# Patient Record
Sex: FEMALE | Race: BLACK OR AFRICAN AMERICAN | NOT HISPANIC OR LATINO | Employment: FULL TIME | URBAN - METROPOLITAN AREA
[De-identification: names, ages, dates, MRNs, and addresses within clinical notes are randomized per-mention and may not be internally consistent; named-entity substitution may affect disease eponyms.]

---

## 2021-12-07 LAB — POC BETA-HCG (QUAL): NEGATIVE

## 2022-02-07 ENCOUNTER — HISTORICAL (OUTPATIENT)
Dept: SLEEP MEDICINE | Facility: HOSPITAL | Age: 42
End: 2022-02-07

## 2022-04-25 DIAGNOSIS — G43.009 MIGRAINE WITHOUT AURA AND WITHOUT STATUS MIGRAINOSUS, NOT INTRACTABLE: Primary | ICD-10-CM

## 2022-06-01 PROBLEM — E11.9 DIABETES MELLITUS: Status: ACTIVE | Noted: 2022-06-01

## 2022-06-01 PROBLEM — F90.0 ATTENTION DEFICIT HYPERACTIVITY DISORDER (ADHD), PREDOMINANTLY INATTENTIVE TYPE: Status: ACTIVE | Noted: 2022-06-01

## 2022-06-01 PROBLEM — J30.2 SEASONAL ALLERGIES: Status: ACTIVE | Noted: 2022-06-01

## 2022-06-01 PROBLEM — D64.9 ANEMIA: Status: ACTIVE | Noted: 2022-06-01

## 2022-06-01 PROBLEM — H26.9 CATARACT: Status: ACTIVE | Noted: 2022-06-01

## 2022-06-01 PROBLEM — K57.92 DIVERTICULITIS: Status: ACTIVE | Noted: 2022-06-01

## 2022-06-01 PROBLEM — F90.0 ATTENTION DEFICIT HYPERACTIVITY DISORDER (ADHD), PREDOMINANTLY INATTENTIVE TYPE: Chronic | Status: ACTIVE | Noted: 2022-06-01

## 2022-06-01 PROBLEM — D64.9 ANEMIA: Chronic | Status: ACTIVE | Noted: 2022-06-01

## 2022-06-01 NOTE — PROGRESS NOTES
[facility] Neurology initial Office Visit Note    Initial Visit Date:  June 2, 2022  Current Visit Date:  06/02/2022    Chief Complaint:     Chief Complaint   Patient presents with    Migraine     States about 21 migraines a week.          History of Present Illness:      This is 42 y.o. female with history of ADHD, type 2 diabetes, who is referred headache disorder.    Age of Onset : 7 years old     Headache Description:   1. Frontal, holocephalic, pounding, severe, impeding day to day activity, lasting 2 days, w/ nausea, w/ photophobia and phonophobia   2. Frontal, occipital, dull, mild, duration varies, w/o nausea, w/o photophobia and phonophobia      Frequency: daily headache days per month for 12 - 16 migraine headache days per month since she had contract COVID in 11/2020.      Provocation Factors: stress     Risk Factors  - Family history of headache disorder: Yes mom with headache disorder  - History of focal CNS lesions: No  - History of CNS infections: No  - History head trauma: No  - History of underlying mood disorder: No  - History of sleep disorder: Yes not sleeping well at night  - Recreational drug use: Yes CBD use  - Tobacco use: No  - Alcohol use: No  - Weight fluctuation: No  - Isotretinoin or Tetracycline use:  Not Applicable  - Family planning and contraceptive use: Yes OCP     Medications:     Current Prophylactic  Gabapentin 800 mg twice a day (March 16, 2022 to present)    Current Abortive  Celecoxib 200 mg twice a day as needed - twice a day  Methocarbamol    Prior Prophylactic  non    Prior Abortive   Ibuprofen  Tramadol   Toradol     Devices:     - VNS:  - TNS  - TMS:     Procedures:     - Botox:  - PSG block:   - Occipital nerve block:     Labs:     No results found for this or any previous visit.    Studies:     - MRI Brain:   - MRA Head w/o Jayson:   - MRV Head w/o Jayson:   - NCHCT:  - Lumbar Puncture:    Review of Systems:     Review of Systems   All other systems reviewed and are  negative.      Physical Exams:     Vitals:    06/02/22 1332   BP: 126/84   Pulse: 80   Resp: 16   Temp: 97.9 °F (36.6 °C)       Physical Exam  Vitals and nursing note reviewed.   Constitutional:       Appearance: Normal appearance.   HENT:      Head: Normocephalic and atraumatic.      Nose: Nose normal.      Mouth/Throat:      Mouth: Mucous membranes are moist.      Pharynx: Oropharynx is clear.   Eyes:      Conjunctiva/sclera: Conjunctivae normal.   Cardiovascular:      Rate and Rhythm: Normal rate and regular rhythm.      Pulses: Normal pulses.   Pulmonary:      Effort: Pulmonary effort is normal.      Breath sounds: Normal breath sounds.   Abdominal:      General: Abdomen is flat.   Musculoskeletal:         General: Normal range of motion.      Cervical back: Normal range of motion.   Skin:     General: Skin is warm.   Neurological:      Mental Status: She is alert.         Comprehensive Neurological Exam:  Mental Status: Alert Oriented to Self, Date, and Place. Naming, repetition, reading, and writing wnl. Comprehension wnl. No dysarthria.   CN II - XII: PERCY, No APD, Fundus wnl OU, VA grossly intact to finger counting at 6 ft, VFFC, No ptosis OU, EOMI without nystagmus LT/Temp symmetric in CN V1-3 distribution, Hearing grossly intact, Face Symmetric, Tongue and Uvula midline, Trapezius symmetric bilateral.   Motor: tone and bulk wnl throughout, no abnormal involuntary or voluntary movements, 5/5 to confrontation, Fine finger movements wnl b/l, No pronator drift.   Sensory: LT, Proprioception, Vibration, PP, Temp symmetric. No sensory simultagnosia.   Reflexes: 2+ throughout, plantar reflexes downward bilateral.   Cerebellar: FNF wnl b/l, RAHM wnl b/l  Romberg: Negative  Gait: normal.     Assessment:     This is 42 y.o. female with history of  ADHD, type 2 diabetes, who is referred chronic migraine without aura, not intractable and COVID long hauler.      Problem List Items Addressed This Visit        Neuro     Chronic migraine without aura without status migrainosus, not intractable    COVID-19 long hauler manifesting chronic concentration deficit       Psychiatric    Attention deficit hyperactivity disorder (ADHD), predominantly inattentive type (Chronic)       Endocrine    Type II diabetes mellitus, uncontrolled (Chronic)    Relevant Medications    metFORMIN (GLUCOPHAGE-XR) 500 MG ER 24hr tablet       GI    Diverticulitis - Primary          Plan:     [] start TPM ER 50 mg daily   [] start Maxalt 10 mg BID PRN   [] start Reglan 5 mg BID PRN     RTC 3 months    Headache education provided: good sleep hygiene and 7 hours of sleep per night, stress management, medication overuse education provided. Using more 3 OTC per week may worsen headaches, high intensity interval training has shown to reduce headache frequency. Low carb, high protein has shown to reduce headache frequency. Patient is instructed in keep headache diary.     I have explained the treatment plan, diagnosis, and prognosis to patient. All questions are answered to the best of my knowledge.     Face to face time 45 minutes, including documentation, chart review, counseling, education, review of test results, relevant medical records, and coordination of care.       Mariana Avilez MD   General Neurology  06/02/2022

## 2022-06-02 ENCOUNTER — OFFICE VISIT (OUTPATIENT)
Dept: NEUROLOGY | Facility: CLINIC | Age: 42
End: 2022-06-02
Payer: MEDICAID

## 2022-06-02 VITALS
TEMPERATURE: 98 F | BODY MASS INDEX: 39.25 KG/M2 | OXYGEN SATURATION: 99 % | HEART RATE: 80 BPM | WEIGHT: 289.81 LBS | SYSTOLIC BLOOD PRESSURE: 126 MMHG | DIASTOLIC BLOOD PRESSURE: 84 MMHG | RESPIRATION RATE: 16 BRPM | HEIGHT: 72 IN

## 2022-06-02 DIAGNOSIS — G43.709 CHRONIC MIGRAINE WITHOUT AURA WITHOUT STATUS MIGRAINOSUS, NOT INTRACTABLE: Primary | ICD-10-CM

## 2022-06-02 DIAGNOSIS — E11.65 UNCONTROLLED TYPE 2 DIABETES MELLITUS WITH HYPERGLYCEMIA: ICD-10-CM

## 2022-06-02 DIAGNOSIS — K57.92 DIVERTICULITIS: ICD-10-CM

## 2022-06-02 DIAGNOSIS — U09.9 COVID-19 LONG HAULER MANIFESTING CHRONIC CONCENTRATION DEFICIT: ICD-10-CM

## 2022-06-02 DIAGNOSIS — R41.840 COVID-19 LONG HAULER MANIFESTING CHRONIC CONCENTRATION DEFICIT: ICD-10-CM

## 2022-06-02 DIAGNOSIS — F90.0 ATTENTION DEFICIT HYPERACTIVITY DISORDER (ADHD), PREDOMINANTLY INATTENTIVE TYPE: ICD-10-CM

## 2022-06-02 PROCEDURE — 3008F PR BODY MASS INDEX (BMI) DOCUMENTED: ICD-10-PCS | Mod: CPTII,,, | Performed by: PSYCHIATRY & NEUROLOGY

## 2022-06-02 PROCEDURE — 99204 OFFICE O/P NEW MOD 45 MIN: CPT | Mod: S$PBB,,, | Performed by: PSYCHIATRY & NEUROLOGY

## 2022-06-02 PROCEDURE — 1159F MED LIST DOCD IN RCRD: CPT | Mod: CPTII,,, | Performed by: PSYCHIATRY & NEUROLOGY

## 2022-06-02 PROCEDURE — 3074F PR MOST RECENT SYSTOLIC BLOOD PRESSURE < 130 MM HG: ICD-10-PCS | Mod: CPTII,,, | Performed by: PSYCHIATRY & NEUROLOGY

## 2022-06-02 PROCEDURE — 1159F PR MEDICATION LIST DOCUMENTED IN MEDICAL RECORD: ICD-10-PCS | Mod: CPTII,,, | Performed by: PSYCHIATRY & NEUROLOGY

## 2022-06-02 PROCEDURE — 3074F SYST BP LT 130 MM HG: CPT | Mod: CPTII,,, | Performed by: PSYCHIATRY & NEUROLOGY

## 2022-06-02 PROCEDURE — 3008F BODY MASS INDEX DOCD: CPT | Mod: CPTII,,, | Performed by: PSYCHIATRY & NEUROLOGY

## 2022-06-02 PROCEDURE — 3079F DIAST BP 80-89 MM HG: CPT | Mod: CPTII,,, | Performed by: PSYCHIATRY & NEUROLOGY

## 2022-06-02 PROCEDURE — 3079F PR MOST RECENT DIASTOLIC BLOOD PRESSURE 80-89 MM HG: ICD-10-PCS | Mod: CPTII,,, | Performed by: PSYCHIATRY & NEUROLOGY

## 2022-06-02 PROCEDURE — 99215 OFFICE O/P EST HI 40 MIN: CPT | Mod: PBBFAC | Performed by: PSYCHIATRY & NEUROLOGY

## 2022-06-02 PROCEDURE — 99204 PR OFFICE/OUTPT VISIT, NEW, LEVL IV, 45-59 MIN: ICD-10-PCS | Mod: S$PBB,,, | Performed by: PSYCHIATRY & NEUROLOGY

## 2022-06-02 RX ORDER — PANTOPRAZOLE SODIUM 40 MG/1
40 TABLET, DELAYED RELEASE ORAL DAILY
COMMUNITY
Start: 2022-02-22

## 2022-06-02 RX ORDER — ONDANSETRON 4 MG/1
TABLET, ORALLY DISINTEGRATING ORAL
COMMUNITY
Start: 2022-02-15 | End: 2022-06-02

## 2022-06-02 RX ORDER — TAMSULOSIN HYDROCHLORIDE 0.4 MG/1
CAPSULE ORAL
COMMUNITY
Start: 2022-02-24 | End: 2022-06-02

## 2022-06-02 RX ORDER — CETIRIZINE HYDROCHLORIDE 10 MG/1
10 TABLET ORAL DAILY
COMMUNITY
Start: 2022-05-24

## 2022-06-02 RX ORDER — NORETHINDRONE ACETATE AND ETHINYL ESTRADIOL, ETHINYL ESTRADIOL AND FERROUS FUMARATE 1MG-10(24)
1 KIT ORAL DAILY
COMMUNITY
Start: 2022-05-16 | End: 2023-03-29 | Stop reason: SDUPTHER

## 2022-06-02 RX ORDER — CELECOXIB 200 MG/1
200 CAPSULE ORAL 2 TIMES DAILY
COMMUNITY
Start: 2022-04-18 | End: 2023-11-08

## 2022-06-02 RX ORDER — PREDNISONE 10 MG/1
10 TABLET ORAL DAILY
COMMUNITY
Start: 2022-05-02 | End: 2023-07-10 | Stop reason: ALTCHOICE

## 2022-06-02 RX ORDER — METHOCARBAMOL 500 MG/1
500 TABLET, FILM COATED ORAL 3 TIMES DAILY PRN
COMMUNITY
Start: 2022-05-09

## 2022-06-02 RX ORDER — LISDEXAMFETAMINE DIMESYLATE 60 MG/1
CAPSULE ORAL
COMMUNITY
End: 2022-08-25

## 2022-06-02 RX ORDER — GABAPENTIN 800 MG/1
800 TABLET ORAL 2 TIMES DAILY
COMMUNITY
Start: 2022-05-24 | End: 2023-07-10

## 2022-06-02 RX ORDER — RIZATRIPTAN BENZOATE 10 MG/1
10 TABLET ORAL
Qty: 9 TABLET | Refills: 4 | Status: SHIPPED | OUTPATIENT
Start: 2022-06-02 | End: 2022-09-01 | Stop reason: SDUPTHER

## 2022-06-02 RX ORDER — EPINEPHRINE 0.3 MG/.3ML
INJECTION SUBCUTANEOUS
COMMUNITY
Start: 2022-02-15

## 2022-06-02 RX ORDER — TOPIRAMATE 50 MG/1
50 TABLET, FILM COATED ORAL NIGHTLY
Qty: 30 TABLET | Refills: 4 | Status: SHIPPED | OUTPATIENT
Start: 2022-06-02 | End: 2022-09-01 | Stop reason: SDUPTHER

## 2022-06-02 RX ORDER — TRIAMCINOLONE ACETONIDE 1 MG/G
OINTMENT TOPICAL
COMMUNITY
Start: 2022-05-31

## 2022-06-02 RX ORDER — TRAZODONE HYDROCHLORIDE 150 MG/1
150 TABLET ORAL NIGHTLY
COMMUNITY
Start: 2022-05-24

## 2022-06-02 RX ORDER — BUPROPION HYDROCHLORIDE 150 MG/1
150 TABLET, EXTENDED RELEASE ORAL 2 TIMES DAILY
COMMUNITY
Start: 2022-05-11 | End: 2022-08-25

## 2022-06-02 RX ORDER — METFORMIN HYDROCHLORIDE 500 MG/1
500 TABLET, EXTENDED RELEASE ORAL DAILY
COMMUNITY
Start: 2022-05-31 | End: 2023-11-08

## 2022-06-02 RX ORDER — METOCLOPRAMIDE 5 MG/1
5 TABLET ORAL 2 TIMES DAILY PRN
Qty: 20 TABLET | Refills: 1 | Status: SHIPPED | OUTPATIENT
Start: 2022-06-02 | End: 2022-07-29

## 2022-06-02 RX ORDER — HYDROCORTISONE 25 MG/G
CREAM TOPICAL
COMMUNITY
Start: 2022-06-01

## 2022-06-02 RX ORDER — ESZOPICLONE 3 MG/1
TABLET, FILM COATED ORAL
COMMUNITY
End: 2022-08-25

## 2022-06-02 RX ORDER — FLUDROCORTISONE ACETATE 0.1 MG/1
100 TABLET ORAL DAILY
COMMUNITY
Start: 2022-05-24 | End: 2022-08-25

## 2022-06-02 RX ORDER — FERROUS SULFATE 325(65) MG
TABLET ORAL
COMMUNITY
End: 2022-08-25

## 2022-06-02 RX ORDER — PIMECROLIMUS 10 MG/G
1 CREAM TOPICAL 2 TIMES DAILY
COMMUNITY
Start: 2022-05-02

## 2022-06-02 RX ORDER — KETOCONAZOLE 20 MG/G
CREAM TOPICAL
COMMUNITY
Start: 2022-06-01

## 2022-07-01 ENCOUNTER — TELEPHONE (OUTPATIENT)
Dept: NEUROLOGY | Facility: CLINIC | Age: 42
End: 2022-07-01
Payer: MEDICAID

## 2022-07-01 NOTE — TELEPHONE ENCOUNTER
----- Message from Jeane Foote sent at 7/1/2022 10:30 AM CDT -----  Patient requesting sooner follow up with Dr Avilez.   Patient maciel scheduled on 9/01/22    Patient was seen in ED at [facility] for panic attack on 6/30/22.    Please Advise  [phone]

## 2022-07-06 NOTE — TELEPHONE ENCOUNTER
Spoke to patient. She will keep her appointment on 9/1 and will follow up with PCP for anxiety attacks. She will also keep other scheduled appointments such as cardiology and mental health. Instructed to call for further needs--verbalized understanding.

## 2022-07-06 NOTE — TELEPHONE ENCOUNTER
"Spoke to patient; States that she went to ED in [place] 6/30 with her first anxiety attack. Reports tears, heavy legs, shortness of breath, increased BP, and headache while in ED.   According to ED notes, she is to wean Wellbutrin and begin Paxil.   States that she is in the process of weaning Wellbutrin and will begin her Paxil regimen (25 mg po once a day) next week.   States that she has an appointment with her "mental health nurse practitioner" (Kyler Remy at the [facility]) tomorrow. Advised to keep appointment.   Reports that she has an appointment to see her cardiologist this coming Friday (7/8). Advised to keep that appointment as well.   Instructed to keep upcoming appointment with Dr. Avilez on 9/1--verbalizes understanding.    Will request further advisement.  "

## 2022-07-29 DIAGNOSIS — E11.65 UNCONTROLLED TYPE 2 DIABETES MELLITUS WITH HYPERGLYCEMIA: ICD-10-CM

## 2022-07-29 DIAGNOSIS — U09.9 COVID-19 LONG HAULER MANIFESTING CHRONIC CONCENTRATION DEFICIT: ICD-10-CM

## 2022-07-29 DIAGNOSIS — G43.709 CHRONIC MIGRAINE WITHOUT AURA WITHOUT STATUS MIGRAINOSUS, NOT INTRACTABLE: ICD-10-CM

## 2022-07-29 DIAGNOSIS — K57.92 DIVERTICULITIS: ICD-10-CM

## 2022-07-29 DIAGNOSIS — R41.840 COVID-19 LONG HAULER MANIFESTING CHRONIC CONCENTRATION DEFICIT: ICD-10-CM

## 2022-07-29 DIAGNOSIS — F90.0 ATTENTION DEFICIT HYPERACTIVITY DISORDER (ADHD), PREDOMINANTLY INATTENTIVE TYPE: ICD-10-CM

## 2022-07-29 RX ORDER — METOCLOPRAMIDE 5 MG/1
TABLET ORAL
Qty: 20 TABLET | Refills: 1 | Status: SHIPPED | OUTPATIENT
Start: 2022-07-29 | End: 2022-09-01 | Stop reason: SDUPTHER

## 2022-08-25 ENCOUNTER — PROCEDURE VISIT (OUTPATIENT)
Dept: UROLOGY | Facility: CLINIC | Age: 42
End: 2022-08-25
Payer: MEDICAID

## 2022-08-25 VITALS
WEIGHT: 275.38 LBS | HEIGHT: 72 IN | DIASTOLIC BLOOD PRESSURE: 84 MMHG | OXYGEN SATURATION: 98 % | BODY MASS INDEX: 37.3 KG/M2 | HEART RATE: 73 BPM | RESPIRATION RATE: 18 BRPM | SYSTOLIC BLOOD PRESSURE: 120 MMHG

## 2022-08-25 DIAGNOSIS — N39.0 URINARY TRACT INFECTION WITHOUT HEMATURIA, SITE UNSPECIFIED: Primary | ICD-10-CM

## 2022-08-25 LAB
APPEARANCE UR: CLEAR
BACTERIA #/AREA URNS AUTO: ABNORMAL /HPF
BILIRUB UR QL STRIP.AUTO: NEGATIVE MG/DL
CAOX CRY URNS QL MICRO: ABNORMAL /HPF
COLOR UR AUTO: YELLOW
GLUCOSE UR QL STRIP.AUTO: NORMAL MG/DL
HYALINE CASTS #/AREA URNS LPF: ABNORMAL /LPF
KETONES UR QL STRIP.AUTO: NEGATIVE MG/DL
LEUKOCYTE ESTERASE UR QL STRIP.AUTO: NEGATIVE UNIT/L
MUCOUS THREADS URNS QL MICRO: ABNORMAL /LPF
NITRITE UR QL STRIP.AUTO: NEGATIVE
PH UR STRIP.AUTO: 7 [PH]
PROT UR QL STRIP.AUTO: NEGATIVE MG/DL
RBC #/AREA URNS AUTO: ABNORMAL /HPF
RBC UR QL AUTO: ABNORMAL UNIT/L
SP GR UR STRIP.AUTO: 1.01
SQUAMOUS #/AREA URNS LPF: ABNORMAL /HPF
UROBILINOGEN UR STRIP-ACNC: ABNORMAL MG/DL
WBC #/AREA URNS AUTO: ABNORMAL /HPF

## 2022-08-25 PROCEDURE — 52000 CYSTOURETHROSCOPY: CPT | Mod: PBBFAC | Performed by: UROLOGY

## 2022-08-25 PROCEDURE — 87088 URINE BACTERIA CULTURE: CPT | Performed by: UROLOGY

## 2022-08-25 PROCEDURE — 81001 URINALYSIS AUTO W/SCOPE: CPT | Performed by: UROLOGY

## 2022-08-25 RX ORDER — CIPROFLOXACIN 500 MG/1
500 TABLET ORAL
Status: COMPLETED | OUTPATIENT
Start: 2022-08-25 | End: 2022-08-25

## 2022-08-25 RX ADMIN — CIPROFLOXACIN 500 MG: 500 TABLET ORAL at 10:08

## 2022-08-25 NOTE — PROGRESS NOTES
Chief Complaint:   Chief Complaint   Patient presents with    Cystoscopy for recurrent UTIs     Cystoscopy for recurrent UTIs; Last appt: 2022       HPI: Ms. Martinez presents for follow up. Referred to urology clinic for recurrent UTIs. Last positive culture in chart 10/2021. Asymptomatic today. She's had changes in medications since last visit. She continues to follow with her PCP. Here today for cysto.      Allergies:  Review of patient's allergies indicates:  No Known Allergies    Medications:  Current Outpatient Medications   Medication Sig Dispense Refill    celecoxib (CELEBREX) 200 MG capsule       cetirizine (ZYRTEC) 10 MG tablet Take 10 mg by mouth once daily.      ELIDEL 1 % cream Apply 1 application topically 2 (two) times daily.      EPINEPHrine (EPIPEN) 0.3 mg/0.3 mL AtIn       gabapentin (NEURONTIN) 800 MG tablet Take by mouth.      hydrocortisone 2.5 % cream Apply topically.      ketoconazole (NIZORAL) 2 % cream SMARTSI Application Topical 1 to 2 Times Daily      LO LOESTRIN FE 1 mg-10 mcg (24)/10 mcg (2) Tab Take 1 tablet by mouth once daily.      metFORMIN (GLUCOPHAGE-XR) 500 MG ER 24hr tablet Take 500 mg by mouth once daily.      methocarbamoL (ROBAXIN) 500 MG Tab Take 500 mg by mouth 3 (three) times daily.      metoclopramide HCl (REGLAN) 5 MG tablet TAKE 1 TABLET BY MOUTH TWICE DAILY IF NEEDED FOR NAUSEA 20 tablet 1    pantoprazole (PROTONIX) 40 MG tablet       predniSONE (DELTASONE) 10 MG tablet Take 10 mg by mouth once daily.      topiramate (TOPAMAX) 50 MG tablet Take 1 tablet (50 mg total) by mouth nightly. 30 tablet 4    traZODone (DESYREL) 150 MG tablet Take 150 mg by mouth nightly.      triamcinolone acetonide 0.1% (KENALOG) 0.1 % ointment SMARTSI Application Topical 2-3 Times Daily      buPROPion (WELLBUTRIN SR) 150 MG TBSR 12 hr tablet Take 150 mg by mouth 2 (two) times daily.      eszopiclone (LUNESTA) 3 mg Tab 1 tablet immediately before bedtime       ferrous sulfate (FEOSOL) 325 mg (65 mg iron) Tab tablet 1 tablet      fludrocortisone (FLORINEF) 0.1 mg Tab Take 100 mcg by mouth once daily.      lisdexamfetamine (VYVANSE) 60 MG capsule 1 capsule in the morning      rizatriptan (MAXALT) 10 MG tablet Take 1 tablet (10 mg total) by mouth as needed for Migraine (once every 2 hour as needed with max of 2 tablet in 24 hours). 9 tablet 4     No current facility-administered medications for this visit.       Review of Systems:  General: No fever, chills, fatigability, or weight loss.  Skin: No rashes, itching, or changes in color or texture of skin.  Chest: Denies PICKERING, cyanosis, wheezing, cough, and sputum production.  Abdomen: Appetite fine. No weight loss. Denies diarrhea, abdominal pain, hematemesis, or blood in stool.  Musculoskeletal: No joint stiffness or swelling. Denies back pain.  : As above.  All other review of systems negative.    PMH:  History reviewed. No pertinent past medical history.    PSH:  Past Surgical History:   Procedure Laterality Date    Arm surgery      Right arm fracture repair    Cataract surgery      EYE SURGERY      GASTRIC BYPASS      INGUINAL HERNIA REPAIR         FamHx:  History reviewed. No pertinent family history.    SocHx:  Social History     Socioeconomic History    Marital status: Single   Tobacco Use    Smoking status: Former Smoker    Smokeless tobacco: Never Used   Substance and Sexual Activity    Alcohol use: Yes     Comment: 1-2 per month    Drug use: Yes     Comment: CBD Gummies-takes occasionally       Physical Exam:  Vitals:    08/25/22 0949   BP: 120/84   Pulse: 73   Resp: 18     General: A&Ox3, no apparent distress, no deformities  Neck: No masses, normal thyroid  Lungs: CTA michelle, no use of accessory muscles  Heart: RRR, no arrhythmias  Abdomen: Soft, NT, ND, no masses, no hernias, no hepatosplenomegaly  Lymphatic: Neck and groin nodes negative  Skin: The skin is warm and dry. No jaundice.  Ext: No  c/c/e.  GUFemale: External genitalia normal. No lesions. Meatus normal size and location. Urethra normal. No masses. Bladder normal. No fullness or masses. Vagina normal without discharge or lesions.       Procedure:    Informed consent was obtained. She was premedicated with an oral antibiotic. The vagina was prepped with topical betadine. A 17Fr flexible cystoscope was advanced per urethra into the bladder under direct vision. Bilateral Uo's were in orthotopic location. All bladder surfaces were evaluated. There was no evidence of papillary tumor, trabeculation, diverticula or stone. No urethral pathology. Urine cloudy. The scope was removed. She tolerated the procedure well.  Cath urine specimen obtained.    Impression:  Problem List Items Addressed This Visit    None     Visit Diagnoses     Urinary tract infection without hematuria, site unspecified    -  Primary    Relevant Orders    Urine culture    Urinalysis            Plan:  - Cysto negative but urine cloudy. Cath specimen obtained and sent for culture. Treat if positive. UTI prevention with 4 point plan. Only treat symptomatic bacteriuria based on culture. Better to obtain cath specimen to avoid contamination      Le Frankel MD  8/25/2022  Urology

## 2022-08-25 NOTE — PATIENT INSTRUCTIONS
Pt seen by Dr. Frankel. Cysto performed at visit. Consents signed and obtained by staff. Urine was also collected by staff to be brought to lab. Pt will be notified of results. RTC 4 months with Jonnie FARLEY. Pt education given both written and verbal. TM

## 2022-08-27 LAB — BACTERIA UR CULT: NO GROWTH

## 2022-08-31 PROBLEM — G43.709 CHRONIC MIGRAINE WITHOUT AURA WITHOUT STATUS MIGRAINOSUS, NOT INTRACTABLE: Chronic | Status: ACTIVE | Noted: 2022-06-02

## 2022-08-31 PROBLEM — R41.840 COVID-19 LONG HAULER MANIFESTING CHRONIC CONCENTRATION DEFICIT: Chronic | Status: ACTIVE | Noted: 2022-06-02

## 2022-08-31 PROBLEM — U09.9 COVID-19 LONG HAULER MANIFESTING CHRONIC CONCENTRATION DEFICIT: Chronic | Status: ACTIVE | Noted: 2022-06-02
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
## 2022-09-01 ENCOUNTER — OFFICE VISIT (OUTPATIENT)
Dept: NEUROLOGY | Facility: CLINIC | Age: 42
End: 2022-09-01
Payer: MEDICAID

## 2022-09-01 VITALS
BODY MASS INDEX: 38.16 KG/M2 | WEIGHT: 281.75 LBS | DIASTOLIC BLOOD PRESSURE: 82 MMHG | OXYGEN SATURATION: 99 % | RESPIRATION RATE: 18 BRPM | SYSTOLIC BLOOD PRESSURE: 122 MMHG | HEART RATE: 64 BPM | HEIGHT: 72 IN | TEMPERATURE: 98 F

## 2022-09-01 DIAGNOSIS — G43.709 CHRONIC MIGRAINE WITHOUT AURA WITHOUT STATUS MIGRAINOSUS, NOT INTRACTABLE: Primary | Chronic | ICD-10-CM

## 2022-09-01 DIAGNOSIS — R41.840 COVID-19 LONG HAULER MANIFESTING CHRONIC CONCENTRATION DEFICIT: Chronic | ICD-10-CM

## 2022-09-01 DIAGNOSIS — F90.0 ATTENTION DEFICIT HYPERACTIVITY DISORDER (ADHD), PREDOMINANTLY INATTENTIVE TYPE: ICD-10-CM

## 2022-09-01 DIAGNOSIS — E11.65 UNCONTROLLED TYPE 2 DIABETES MELLITUS WITH HYPERGLYCEMIA: ICD-10-CM

## 2022-09-01 DIAGNOSIS — K57.92 DIVERTICULITIS: ICD-10-CM

## 2022-09-01 DIAGNOSIS — U09.9 COVID-19 LONG HAULER MANIFESTING CHRONIC CONCENTRATION DEFICIT: Chronic | ICD-10-CM

## 2022-09-01 PROCEDURE — 3079F PR MOST RECENT DIASTOLIC BLOOD PRESSURE 80-89 MM HG: ICD-10-PCS | Mod: CPTII,,, | Performed by: PSYCHIATRY & NEUROLOGY

## 2022-09-01 PROCEDURE — 3079F DIAST BP 80-89 MM HG: CPT | Mod: CPTII,,, | Performed by: PSYCHIATRY & NEUROLOGY

## 2022-09-01 PROCEDURE — 1159F MED LIST DOCD IN RCRD: CPT | Mod: CPTII,,, | Performed by: PSYCHIATRY & NEUROLOGY

## 2022-09-01 PROCEDURE — 99215 OFFICE O/P EST HI 40 MIN: CPT | Mod: S$PBB,,, | Performed by: PSYCHIATRY & NEUROLOGY

## 2022-09-01 PROCEDURE — 3008F PR BODY MASS INDEX (BMI) DOCUMENTED: ICD-10-PCS | Mod: CPTII,,, | Performed by: PSYCHIATRY & NEUROLOGY

## 2022-09-01 PROCEDURE — 99215 PR OFFICE/OUTPT VISIT, EST, LEVL V, 40-54 MIN: ICD-10-PCS | Mod: S$PBB,,, | Performed by: PSYCHIATRY & NEUROLOGY

## 2022-09-01 PROCEDURE — 3008F BODY MASS INDEX DOCD: CPT | Mod: CPTII,,, | Performed by: PSYCHIATRY & NEUROLOGY

## 2022-09-01 PROCEDURE — 1159F PR MEDICATION LIST DOCUMENTED IN MEDICAL RECORD: ICD-10-PCS | Mod: CPTII,,, | Performed by: PSYCHIATRY & NEUROLOGY

## 2022-09-01 PROCEDURE — 3074F PR MOST RECENT SYSTOLIC BLOOD PRESSURE < 130 MM HG: ICD-10-PCS | Mod: CPTII,,, | Performed by: PSYCHIATRY & NEUROLOGY

## 2022-09-01 PROCEDURE — 99215 OFFICE O/P EST HI 40 MIN: CPT | Mod: PBBFAC | Performed by: PSYCHIATRY & NEUROLOGY

## 2022-09-01 PROCEDURE — 3074F SYST BP LT 130 MM HG: CPT | Mod: CPTII,,, | Performed by: PSYCHIATRY & NEUROLOGY

## 2022-09-01 RX ORDER — TOPIRAMATE 100 MG/1
100 TABLET, FILM COATED ORAL NIGHTLY
Qty: 30 TABLET | Refills: 5 | Status: SHIPPED | OUTPATIENT
Start: 2022-09-01 | End: 2022-12-01 | Stop reason: SDUPTHER

## 2022-09-01 RX ORDER — RIZATRIPTAN BENZOATE 10 MG/1
10 TABLET ORAL
Qty: 9 TABLET | Refills: 4 | Status: SHIPPED | OUTPATIENT
Start: 2022-09-01 | End: 2024-01-31

## 2022-09-01 RX ORDER — METOCLOPRAMIDE 5 MG/1
5 TABLET ORAL 2 TIMES DAILY PRN
Qty: 20 TABLET | Refills: 4 | Status: SHIPPED | OUTPATIENT
Start: 2022-09-01 | End: 2023-11-08

## 2022-09-22 ENCOUNTER — HISTORICAL (OUTPATIENT)
Dept: ADMINISTRATIVE | Facility: HOSPITAL | Age: 42
End: 2022-09-22
Payer: MEDICAID

## 2022-11-30 DIAGNOSIS — U09.9 COVID-19 LONG HAULER MANIFESTING CHRONIC CONCENTRATION DEFICIT: ICD-10-CM

## 2022-11-30 DIAGNOSIS — F90.0 ATTENTION DEFICIT HYPERACTIVITY DISORDER (ADHD), PREDOMINANTLY INATTENTIVE TYPE: ICD-10-CM

## 2022-11-30 DIAGNOSIS — G43.709 CHRONIC MIGRAINE WITHOUT AURA WITHOUT STATUS MIGRAINOSUS, NOT INTRACTABLE: ICD-10-CM

## 2022-11-30 DIAGNOSIS — R41.840 COVID-19 LONG HAULER MANIFESTING CHRONIC CONCENTRATION DEFICIT: ICD-10-CM

## 2022-11-30 DIAGNOSIS — K57.92 DIVERTICULITIS: ICD-10-CM

## 2022-11-30 DIAGNOSIS — E11.65 UNCONTROLLED TYPE 2 DIABETES MELLITUS WITH HYPERGLYCEMIA: ICD-10-CM

## 2022-11-30 RX ORDER — TOPIRAMATE 50 MG/1
TABLET, FILM COATED ORAL
Qty: 30 TABLET | Refills: 4 | OUTPATIENT
Start: 2022-11-30

## 2022-12-01 DIAGNOSIS — K57.92 DIVERTICULITIS: ICD-10-CM

## 2022-12-01 DIAGNOSIS — E11.65 UNCONTROLLED TYPE 2 DIABETES MELLITUS WITH HYPERGLYCEMIA: ICD-10-CM

## 2022-12-01 DIAGNOSIS — G43.709 CHRONIC MIGRAINE WITHOUT AURA WITHOUT STATUS MIGRAINOSUS, NOT INTRACTABLE: Primary | Chronic | ICD-10-CM

## 2022-12-01 DIAGNOSIS — R41.840 COVID-19 LONG HAULER MANIFESTING CHRONIC CONCENTRATION DEFICIT: Chronic | ICD-10-CM

## 2022-12-01 DIAGNOSIS — U09.9 COVID-19 LONG HAULER MANIFESTING CHRONIC CONCENTRATION DEFICIT: Chronic | ICD-10-CM

## 2022-12-01 DIAGNOSIS — F90.0 ATTENTION DEFICIT HYPERACTIVITY DISORDER (ADHD), PREDOMINANTLY INATTENTIVE TYPE: ICD-10-CM

## 2022-12-01 RX ORDER — TOPIRAMATE 100 MG/1
100 TABLET, FILM COATED ORAL NIGHTLY
Qty: 30 TABLET | Refills: 5 | Status: SHIPPED | OUTPATIENT
Start: 2022-12-01 | End: 2023-05-22

## 2022-12-07 ENCOUNTER — TELEPHONE (OUTPATIENT)
Dept: NEUROLOGY | Facility: CLINIC | Age: 42
End: 2022-12-07
Payer: MEDICAID

## 2022-12-07 DIAGNOSIS — E11.42 TYPE 2 DIABETES MELLITUS WITH DIABETIC POLYNEUROPATHY, WITHOUT LONG-TERM CURRENT USE OF INSULIN: Primary | ICD-10-CM

## 2022-12-07 NOTE — TELEPHONE ENCOUNTER
----- Message from Natalie Box sent at 12/7/2022 12:02 PM CST -----  Regarding: Referral for podiatrist  Pt called and stated she found a podiatrist in [place] and was scheduled then they called her back and stated she would need a referral from an [facility] provider. Pt ask if Dr. Avilez can sent a referral as they spoke about pt's feet on last visit 9/1/22. The DrHolly Is Dr Etienne phone # [phone]. Pt can be reached @ [phone]        Thank You  Charlene FORRESTER

## 2023-01-09 DIAGNOSIS — E11.9 TYPE 2 DIABETES MELLITUS WITHOUT COMPLICATION, UNSPECIFIED WHETHER LONG TERM INSULIN USE: Primary | ICD-10-CM

## 2023-02-07 DIAGNOSIS — G43.709 CHRONIC MIGRAINE WITHOUT AURA WITHOUT STATUS MIGRAINOSUS, NOT INTRACTABLE: Chronic | ICD-10-CM

## 2023-02-07 DIAGNOSIS — R41.840 COVID-19 LONG HAULER MANIFESTING CHRONIC CONCENTRATION DEFICIT: Chronic | ICD-10-CM

## 2023-02-07 DIAGNOSIS — U09.9 COVID-19 LONG HAULER MANIFESTING CHRONIC CONCENTRATION DEFICIT: Chronic | ICD-10-CM

## 2023-02-08 ENCOUNTER — PROCEDURE VISIT (OUTPATIENT)
Dept: NEUROLOGY | Facility: CLINIC | Age: 43
End: 2023-02-08
Payer: MEDICAID

## 2023-02-08 VITALS
SYSTOLIC BLOOD PRESSURE: 132 MMHG | WEIGHT: 263.19 LBS | RESPIRATION RATE: 14 BRPM | HEIGHT: 72 IN | DIASTOLIC BLOOD PRESSURE: 86 MMHG | TEMPERATURE: 98 F | OXYGEN SATURATION: 100 % | HEART RATE: 68 BPM | BODY MASS INDEX: 35.65 KG/M2

## 2023-02-08 DIAGNOSIS — G43.709 CHRONIC MIGRAINE WITHOUT AURA WITHOUT STATUS MIGRAINOSUS, NOT INTRACTABLE: Primary | ICD-10-CM

## 2023-02-08 PROCEDURE — 64615 PR CHEMODENERVATION OF MUSCLE FOR CHRONIC MIGRAINE: ICD-10-PCS | Mod: S$PBB,,, | Performed by: PSYCHIATRY & NEUROLOGY

## 2023-02-08 PROCEDURE — 64615 CHEMODENERV MUSC MIGRAINE: CPT | Mod: S$PBB,,, | Performed by: PSYCHIATRY & NEUROLOGY

## 2023-02-08 PROCEDURE — 64615 CHEMODENERV MUSC MIGRAINE: CPT | Mod: PBBFAC | Performed by: PSYCHIATRY & NEUROLOGY

## 2023-02-08 RX ORDER — RUXOLITINIB 15 MG/G
CREAM TOPICAL
COMMUNITY
Start: 2022-06-30

## 2023-02-08 RX ORDER — MIDODRINE HYDROCHLORIDE 10 MG/1
10 TABLET ORAL 3 TIMES DAILY
COMMUNITY
Start: 2023-02-03

## 2023-02-08 RX ORDER — CHOLECALCIFEROL (VITAMIN D3) 125 MCG
2 CAPSULE ORAL DAILY
COMMUNITY

## 2023-02-08 RX ORDER — ACETAMINOPHEN, DIPHENHYDRAMINE HCL, PHENYLEPHRINE HCL 325; 25; 5 MG/1; MG/1; MG/1
5000 TABLET ORAL DAILY
COMMUNITY

## 2023-02-08 RX ORDER — AZELASTINE 1 MG/ML
SPRAY, METERED NASAL
COMMUNITY
Start: 2022-07-07 | End: 2023-04-11

## 2023-02-08 RX ORDER — ACETAMINOPHEN 500 MG
2000 TABLET ORAL DAILY
COMMUNITY

## 2023-02-08 RX ORDER — FLUDROCORTISONE ACETATE 0.1 MG/1
200 TABLET ORAL 3 TIMES DAILY
COMMUNITY
Start: 2023-02-03

## 2023-02-08 RX ORDER — COLD-HOT PACK
EACH MISCELLANEOUS DAILY
COMMUNITY

## 2023-02-08 RX ORDER — PAROXETINE HYDROCHLORIDE 20 MG/1
20 TABLET, FILM COATED ORAL DAILY
COMMUNITY
Start: 2023-01-04 | End: 2023-07-10

## 2023-02-08 RX ADMIN — ONABOTULINUMTOXINA 200 UNITS: 200 INJECTION, POWDER, LYOPHILIZED, FOR SOLUTION INTRADERMAL; INTRAMUSCULAR at 11:02

## 2023-02-08 NOTE — PROCEDURES
Procedures    [facility] Neurology Outpatient Botox Procedure Note    History of Present Illness     This is 42 y.o. female with history of ADHD, type 2 diabetes, who is referred chronic migraine without aura, not intractable. Patient is here for Botox #1.     Age of Onset : 7 years old      Headache Description:   1. Frontal, holocephalic, pounding, severe, impeding day to day activity, lasting 2 days, w/ nausea, w/ photophobia and phonophobia   2. Frontal, occipital, dull, mild, duration varies, w/o nausea, w/o photophobia and phonophobia      Frequency: daily headache days per month for 12 - 16 migraine headache days per month    Current Prophylactic  Gabapentin 800 mg twice a day  TPM  mg daily   Paroxetine 20 mg daily      Current Abortive  Methocarbamol  Maxalt 10 mg BID PRN  Reglan 5 mg BID PRN     Review of System      reviewed. stable.    Focused Exam     Stable. Reviewed.    Assessment     This is 42 y.o. female with history of ADHD, type 2 diabetes, who is referred chronic migraine without aura, not intractable. Patient is here for Botox #1.     Procedure     Date of procedure: 2/8/2023    Procedure: Chronic Migraine Chemodenervation with Botulinum toxin    The patient was identified and informed consent was reviewed with the patient, and we discussed the risks, benefits and alternatives. Specifically, we discussed the risks of bleeding, infection and nerve injury with worsened pain and function. Specifically, we discussed the most frequently reported adverse reactions following injection of BOTOX include headache (5%), eyelid ptosis (4%), muscular weakness (4%), bronchitis (3%), injection-site pain (3%), musculoskeletal pain (3%), myalgia (3%), facial paresis (2%), hypertension (2%), and muscle spasms (2%). The patient verbalized an understanding of these risks and the symptoms and the potentially catastrophic consequences of this occurrence. The patient verbalized an understanding that if she should  begin to have these symptoms that she should immediately go to the nearest emergency room for evaluation. The patient was then positioned. The injection sites were identified and was prepped with Alcohol. 4cc of preservative free normal saline was mixed with 200 units of Botox. A 30-gauge, 0.5 inch needle was then used to inject a total 155 units of Botox. Muscles injected as below. Patient tolerated the procedure well with no complaints.    A. : Botox dosage: 10 units in 2 sites Right: 5 Left: 5   B. Procerus Botox dosage: 5 Units in 1 site   C. Frontalis Botox dosage: 20 Units divided in 4 sites Right: 10 Left: 10   D. Temporalis Botox dosage: 40 Units divided in 8 sites Right: 20 Left: 20   E. Occipitalis Botox dosage 30 Units divided in 6 sites Right: 15 Left: 15   F. Cervical Paraspinal Botox dosage: 20 Units divided in 4 sites: Right 10 Left: 10   G. Trapezius Botox dosage: 30 Units divided in 6 sites: Right: 15 Left: 15     Total Units Injected: 155 units   Total Units discarded: 45 units     Follow Up       RTC 2 MONTHS   RTC BOTOX    Mariana Avilez MD   [facility] General Neurology

## 2023-02-09 ENCOUNTER — TELEPHONE (OUTPATIENT)
Dept: PODIATRY | Facility: CLINIC | Age: 43
End: 2023-02-09
Payer: MEDICAID

## 2023-02-09 NOTE — TELEPHONE ENCOUNTER
----- Message from Juan Francisco Rachel sent at 2/9/2023  1:46 PM CST -----  Pt is calling to see if she can be seen sooner than the first available      She booked may 2023    Can she be seen sooner       Pls call pt back at [phone]   Thank you

## 2023-02-22 DIAGNOSIS — K29.00 GASTRITIS, ACUTE: Primary | ICD-10-CM
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
## 2023-04-11 ENCOUNTER — OFFICE VISIT (OUTPATIENT)
Dept: NEUROLOGY | Facility: CLINIC | Age: 43
End: 2023-04-11
Payer: MEDICAID

## 2023-04-11 DIAGNOSIS — G43.709 CHRONIC MIGRAINE WITHOUT AURA WITHOUT STATUS MIGRAINOSUS, NOT INTRACTABLE: Primary | Chronic | ICD-10-CM

## 2023-04-11 DIAGNOSIS — R41.840 COVID-19 LONG HAULER MANIFESTING CHRONIC CONCENTRATION DEFICIT: Chronic | ICD-10-CM

## 2023-04-11 DIAGNOSIS — U09.9 COVID-19 LONG HAULER MANIFESTING CHRONIC CONCENTRATION DEFICIT: Chronic | ICD-10-CM

## 2023-04-11 PROCEDURE — 99214 PR OFFICE/OUTPT VISIT, EST, LEVL IV, 30-39 MIN: ICD-10-PCS | Mod: 95,,, | Performed by: NURSE PRACTITIONER

## 2023-04-11 PROCEDURE — 1160F RVW MEDS BY RX/DR IN RCRD: CPT | Mod: CPTII,95,, | Performed by: NURSE PRACTITIONER

## 2023-04-11 PROCEDURE — 1159F MED LIST DOCD IN RCRD: CPT | Mod: CPTII,95,, | Performed by: NURSE PRACTITIONER

## 2023-04-11 PROCEDURE — 1160F PR REVIEW ALL MEDS BY PRESCRIBER/CLIN PHARMACIST DOCUMENTED: ICD-10-PCS | Mod: CPTII,95,, | Performed by: NURSE PRACTITIONER

## 2023-04-11 PROCEDURE — 1159F PR MEDICATION LIST DOCUMENTED IN MEDICAL RECORD: ICD-10-PCS | Mod: CPTII,95,, | Performed by: NURSE PRACTITIONER

## 2023-04-11 PROCEDURE — 99214 OFFICE O/P EST MOD 30 MIN: CPT | Mod: 95,,, | Performed by: NURSE PRACTITIONER

## 2023-04-11 RX ORDER — KETOCONAZOLE 20 MG/ML
SHAMPOO, SUSPENSION TOPICAL
COMMUNITY
Start: 2023-02-15

## 2023-04-11 RX ORDER — ONDANSETRON 8 MG/1
8 TABLET, ORALLY DISINTEGRATING ORAL EVERY 8 HOURS PRN
COMMUNITY
Start: 2023-02-28 | End: 2023-04-11

## 2023-04-11 RX ORDER — KETOROLAC TROMETHAMINE 10 MG/1
10 TABLET, FILM COATED ORAL 3 TIMES DAILY PRN
COMMUNITY
Start: 2023-02-28 | End: 2023-07-10 | Stop reason: ALTCHOICE

## 2023-05-09 ENCOUNTER — HOSPITAL ENCOUNTER (OUTPATIENT)
Dept: RADIOLOGY | Facility: HOSPITAL | Age: 43
Discharge: HOME | End: 2023-05-09
Attending: REGISTERED NURSE
Payer: MEDICAID

## 2023-05-09 DIAGNOSIS — K29.00 GASTRITIS, ACUTE: ICD-10-CM

## 2023-05-09 PROCEDURE — 78264 GASTRIC EMPTYING IMG STUDY: CPT | Mod: TC

## 2023-05-10 ENCOUNTER — PROCEDURE VISIT (OUTPATIENT)
Dept: NEUROLOGY | Facility: CLINIC | Age: 43
End: 2023-05-10
Payer: MEDICAID

## 2023-05-10 VITALS
HEART RATE: 66 BPM | SYSTOLIC BLOOD PRESSURE: 115 MMHG | DIASTOLIC BLOOD PRESSURE: 79 MMHG | OXYGEN SATURATION: 100 % | TEMPERATURE: 98 F | RESPIRATION RATE: 14 BRPM

## 2023-05-10 DIAGNOSIS — G43.709 CHRONIC MIGRAINE WITHOUT AURA WITHOUT STATUS MIGRAINOSUS, NOT INTRACTABLE: Primary | ICD-10-CM

## 2023-05-10 PROCEDURE — 64615 CHEMODENERV MUSC MIGRAINE: CPT | Mod: S$PBB,,, | Performed by: PSYCHIATRY & NEUROLOGY

## 2023-05-10 PROCEDURE — 96372 THER/PROPH/DIAG INJ SC/IM: CPT | Mod: PBBFAC | Performed by: PSYCHIATRY & NEUROLOGY

## 2023-05-10 PROCEDURE — 64615 PR CHEMODENERVATION OF MUSCLE FOR CHRONIC MIGRAINE: ICD-10-PCS | Mod: S$PBB,,, | Performed by: PSYCHIATRY & NEUROLOGY

## 2023-05-10 PROCEDURE — 64615 CHEMODENERV MUSC MIGRAINE: CPT | Mod: PBBFAC | Performed by: PSYCHIATRY & NEUROLOGY

## 2023-05-10 RX ADMIN — ONABOTULINUMTOXINA 200 UNITS: 200 INJECTION, POWDER, LYOPHILIZED, FOR SOLUTION INTRADERMAL; INTRAMUSCULAR at 10:05

## 2023-05-10 NOTE — PROCEDURES
[facility] Outpatient Botox Procedure Note    History of Present Illness     This is 42 y.o. female with history of ADHD, type 2 diabetes, who is referred chronic migraine without aura, not intractable. Patient is here for Botox #2.     Age of Onset : 7 years old      Headache Description:   1. Frontal, holocephalic, pounding, severe, impeding day to day activity, lasting 2 days, w/ nausea, w/ photophobia and phonophobia   2. Frontal, occipital, dull, mild, duration varies, w/o nausea, w/o photophobia and phonophobia      Baseline Headache Frequency: daily headache days per month 12 - 16 migraine headache days per month  Interval Headache Frequency: daily headache days per month with 8 -12 migraine headache days per month      Current Prophylactic  Gabapentin 800 mg twice a day  TPM  mg daily   Paroxetine 20 mg daily      Current Abortive  Methocarbamol  Maxalt 10 mg BID PRN  Reglan 5 mg BID PRN     Review of System      reviewed. stable.    Focused Exam     Stable. Reviewed.    Assessment     This is 42 y.o. female with history of ADHD, type 2 diabetes, who is referred chronic migraine without aura, not intractable. Patient is here for Botox #2.     Procedure     Date of procedure: 5/10/2023    Procedure: Chronic Migraine Chemodenervation with Botulinum toxin    The patient was identified and informed consent was reviewed with the patient, and we discussed the risks, benefits and alternatives. Specifically, we discussed the risks of bleeding, infection and nerve injury with worsened pain and function. Specifically, we discussed the most frequently reported adverse reactions following injection of BOTOX include headache (5%), eyelid ptosis (4%), muscular weakness (4%), bronchitis (3%), injection-site pain (3%), musculoskeletal pain (3%), myalgia (3%), facial paresis (2%), hypertension (2%), and muscle spasms (2%). The patient verbalized an understanding of these risks and the symptoms and the potentially  catastrophic consequences of this occurrence. The patient verbalized an understanding that if she should begin to have these symptoms that she should immediately go to the nearest emergency room for evaluation. The patient was then positioned. The injection sites were identified and was prepped with Alcohol. 4cc of preservative free normal saline was mixed with 200 units of Botox. A 30-gauge, 0.5 inch needle was then used to inject a total 155 units of Botox. Muscles injected as below. Patient tolerated the procedure well with no complaints.    A. : Botox dosage: 10 units in 2 sites Right: 5 Left: 5   B. Procerus Botox dosage: 5 Units in 1 site   C. Frontalis Botox dosage: 20 Units divided in 4 sites Right: 10 Left: 10   D. Temporalis Botox dosage: 40 Units divided in 8 sites Right: 20 Left: 20   E. Occipitalis Botox dosage 30 Units divided in 6 sites Right: 15 Left: 15   F. Cervical Paraspinal Botox dosage: 20 Units divided in 4 sites: Right 10 Left: 10   G. Trapezius Botox dosage: 30 Units divided in 6 sites: Right: 15 Left: 15     Total Units Injected: 155 units   Total Units discarded: 45 units     Follow Up       RTC 2 MONTHS   RTC BOTOX    Mariana Avilez MD   [facility] Neurology

## 2023-05-19 DIAGNOSIS — G43.709 CHRONIC MIGRAINE WITHOUT AURA WITHOUT STATUS MIGRAINOSUS, NOT INTRACTABLE: Chronic | ICD-10-CM

## 2023-05-22 RX ORDER — TOPIRAMATE 100 MG/1
TABLET, FILM COATED ORAL
Qty: 30 TABLET | Refills: 5 | Status: SHIPPED | OUTPATIENT
Start: 2023-05-22 | End: 2023-06-26

## 2023-06-26 DIAGNOSIS — G43.709 CHRONIC MIGRAINE WITHOUT AURA WITHOUT STATUS MIGRAINOSUS, NOT INTRACTABLE: Chronic | ICD-10-CM

## 2023-06-26 RX ORDER — TOPIRAMATE 100 MG/1
TABLET, FILM COATED ORAL
Qty: 30 TABLET | Refills: 5 | Status: SHIPPED | OUTPATIENT
Start: 2023-06-26 | End: 2024-01-02

## 2023-07-10 ENCOUNTER — OFFICE VISIT (OUTPATIENT)
Dept: NEUROLOGY | Facility: CLINIC | Age: 43
End: 2023-07-10
Payer: MEDICAID

## 2023-07-10 DIAGNOSIS — U09.9 COVID-19 LONG HAULER MANIFESTING CHRONIC CONCENTRATION DEFICIT: Chronic | ICD-10-CM

## 2023-07-10 DIAGNOSIS — E66.09 CLASS 1 OBESITY DUE TO EXCESS CALORIES WITH SERIOUS COMORBIDITY AND BODY MASS INDEX (BMI) OF 34.0 TO 34.9 IN ADULT: ICD-10-CM

## 2023-07-10 DIAGNOSIS — R41.840 COVID-19 LONG HAULER MANIFESTING CHRONIC CONCENTRATION DEFICIT: Chronic | ICD-10-CM

## 2023-07-10 DIAGNOSIS — G43.709 CHRONIC MIGRAINE WITHOUT AURA WITHOUT STATUS MIGRAINOSUS, NOT INTRACTABLE: Primary | Chronic | ICD-10-CM

## 2023-07-10 PROBLEM — E66.811 CLASS 1 OBESITY DUE TO EXCESS CALORIES WITH SERIOUS COMORBIDITY AND BODY MASS INDEX (BMI) OF 34.0 TO 34.9 IN ADULT: Status: ACTIVE | Noted: 2023-07-10

## 2023-07-10 PROCEDURE — 1159F PR MEDICATION LIST DOCUMENTED IN MEDICAL RECORD: ICD-10-PCS | Mod: CPTII,95,, | Performed by: NURSE PRACTITIONER

## 2023-07-10 PROCEDURE — 99214 OFFICE O/P EST MOD 30 MIN: CPT | Mod: 95,,, | Performed by: NURSE PRACTITIONER

## 2023-07-10 PROCEDURE — 99214 PR OFFICE/OUTPT VISIT, EST, LEVL IV, 30-39 MIN: ICD-10-PCS | Mod: 95,,, | Performed by: NURSE PRACTITIONER

## 2023-07-10 PROCEDURE — 1159F MED LIST DOCD IN RCRD: CPT | Mod: CPTII,95,, | Performed by: NURSE PRACTITIONER

## 2023-07-10 PROCEDURE — 1160F PR REVIEW ALL MEDS BY PRESCRIBER/CLIN PHARMACIST DOCUMENTED: ICD-10-PCS | Mod: CPTII,95,, | Performed by: NURSE PRACTITIONER

## 2023-07-10 PROCEDURE — 1160F RVW MEDS BY RX/DR IN RCRD: CPT | Mod: CPTII,95,, | Performed by: NURSE PRACTITIONER

## 2023-07-10 RX ORDER — PAROXETINE 30 MG/1
30 TABLET, FILM COATED ORAL DAILY
COMMUNITY
Start: 2023-06-21

## 2023-07-10 RX ORDER — TOPIRAMATE 25 MG/1
25 TABLET ORAL NIGHTLY
Qty: 30 TABLET | Refills: 11 | Status: SHIPPED | OUTPATIENT
Start: 2023-07-10 | End: 2024-07-09
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
## 2023-08-09 ENCOUNTER — PROCEDURE VISIT (OUTPATIENT)
Dept: NEUROLOGY | Facility: CLINIC | Age: 43
End: 2023-08-09
Payer: MEDICAID

## 2023-08-09 VITALS
SYSTOLIC BLOOD PRESSURE: 110 MMHG | WEIGHT: 250.81 LBS | TEMPERATURE: 98 F | HEART RATE: 78 BPM | OXYGEN SATURATION: 100 % | HEIGHT: 72 IN | BODY MASS INDEX: 33.97 KG/M2 | DIASTOLIC BLOOD PRESSURE: 79 MMHG

## 2023-08-09 DIAGNOSIS — G43.709 CHRONIC MIGRAINE WITHOUT AURA WITHOUT STATUS MIGRAINOSUS, NOT INTRACTABLE: Primary | ICD-10-CM

## 2023-08-09 PROCEDURE — 64615 CHEMODENERV MUSC MIGRAINE: CPT | Mod: PBBFAC | Performed by: PSYCHIATRY & NEUROLOGY

## 2023-08-09 PROCEDURE — 64615 CHEMODENERV MUSC MIGRAINE: CPT | Mod: S$PBB,,, | Performed by: PSYCHIATRY & NEUROLOGY

## 2023-08-09 PROCEDURE — 64615 PR CHEMODENERVATION OF MUSCLE FOR CHRONIC MIGRAINE: ICD-10-PCS | Mod: S$PBB,,, | Performed by: PSYCHIATRY & NEUROLOGY

## 2023-08-09 RX ADMIN — ONABOTULINUMTOXINA 200 UNITS: 200 INJECTION, POWDER, LYOPHILIZED, FOR SOLUTION INTRADERMAL; INTRAMUSCULAR at 10:08

## 2023-09-21 ENCOUNTER — OFFICE VISIT (OUTPATIENT)
Dept: PODIATRY | Facility: CLINIC | Age: 43
End: 2023-09-21
Payer: MEDICAID

## 2023-09-21 VITALS — HEIGHT: 72 IN | WEIGHT: 250 LBS | BODY MASS INDEX: 33.86 KG/M2

## 2023-09-21 DIAGNOSIS — Z98.890 HISTORY OF FOOT SURGERY: ICD-10-CM

## 2023-09-21 DIAGNOSIS — Z87.39 HISTORY OF BACK PAIN: ICD-10-CM

## 2023-09-21 DIAGNOSIS — G57.93 PERIPHERAL NEURITIS OF BOTH FEET: ICD-10-CM

## 2023-09-21 DIAGNOSIS — Z86.79 H/O ORTHOSTATIC HYPOTENSION: ICD-10-CM

## 2023-09-21 DIAGNOSIS — E11.49 TYPE 2 DIABETES MELLITUS WITH OTHER NEUROLOGIC COMPLICATION, WITHOUT LONG-TERM CURRENT USE OF INSULIN: Primary | ICD-10-CM

## 2023-09-21 PROCEDURE — 1159F PR MEDICATION LIST DOCUMENTED IN MEDICAL RECORD: ICD-10-PCS | Mod: CPTII,,, | Performed by: PODIATRIST

## 2023-09-21 PROCEDURE — 99203 PR OFFICE/OUTPT VISIT, NEW, LEVL III, 30-44 MIN: ICD-10-PCS | Mod: S$PBB,,, | Performed by: PODIATRIST

## 2023-09-21 PROCEDURE — 1159F MED LIST DOCD IN RCRD: CPT | Mod: CPTII,,, | Performed by: PODIATRIST

## 2023-09-21 PROCEDURE — 99203 OFFICE O/P NEW LOW 30 MIN: CPT | Mod: S$PBB,,, | Performed by: PODIATRIST

## 2023-09-21 PROCEDURE — 99999 PR PBB SHADOW E&M-EST. PATIENT-LVL III: CPT | Mod: PBBFAC,,, | Performed by: PODIATRIST

## 2023-09-21 PROCEDURE — 1160F RVW MEDS BY RX/DR IN RCRD: CPT | Mod: CPTII,,, | Performed by: PODIATRIST

## 2023-09-21 PROCEDURE — 99213 OFFICE O/P EST LOW 20 MIN: CPT | Mod: PBBFAC | Performed by: PODIATRIST

## 2023-09-21 PROCEDURE — 1160F PR REVIEW ALL MEDS BY PRESCRIBER/CLIN PHARMACIST DOCUMENTED: ICD-10-PCS | Mod: CPTII,,, | Performed by: PODIATRIST

## 2023-09-21 PROCEDURE — 3008F BODY MASS INDEX DOCD: CPT | Mod: CPTII,,, | Performed by: PODIATRIST

## 2023-09-21 PROCEDURE — 3008F PR BODY MASS INDEX (BMI) DOCUMENTED: ICD-10-PCS | Mod: CPTII,,, | Performed by: PODIATRIST

## 2023-09-21 PROCEDURE — 99999 PR PBB SHADOW E&M-EST. PATIENT-LVL III: ICD-10-PCS | Mod: PBBFAC,,, | Performed by: PODIATRIST

## 2023-09-21 NOTE — PROGRESS NOTES
"  Subjective:       Patient ID: Dilan Martinez is a 43 y.o. female.    Chief Complaint: Diabetic Foot Exam (C/o poor circulation, pain 2/10, Diabetic wearing sandals and socks, last saw PCP Patricia CORDERO 08/03/23.)      HPI: Dilan Martinez presents to the office today, under referral by ,Mariana Avilez MD and her PCP Patricia Bledsoe FNP, for her annual diabetic foot assessment and risk evaluation. Does have a history of diabetes mellitus type 2. States history of orthostatic hypotension which is controlled medically and with use of compression socks, and left foot surgery performed by Dr. Mauri Mata. History of post tib advancement vs kidner, Lapidus, plantar fascietomy, and tailors bunion. Presents with nerve oral aspect of the 5th metatarsal bilaterally.  States 2/10 pain with lying down.  States the pain does resolve with walking and ambulation.  She denies any recent trauma or injury.    No results found for: "HGBA1C".    Review of patient's allergies indicates:  No Known Allergies    Past Medical History:   Diagnosis Date    Diabetes mellitus     Headache     Memory loss        Family History   Problem Relation Age of Onset    Diabetes Mother     Diabetes Father     Hypertension Father        Social History     Socioeconomic History    Marital status: Single   Tobacco Use    Smoking status: Former    Smokeless tobacco: Never   Substance and Sexual Activity    Alcohol use: Yes     Alcohol/week: 2.0 standard drinks of alcohol     Types: 2 Glasses of wine per week     Comment: every few months    Drug use: Yes     Comment: CBD Gummies-takes occasionally       Past Surgical History:   Procedure Laterality Date    ANKLE SURGERY Left 03/29/2023    Arm surgery      Right arm fracture repair    Cataract surgery      EYE SURGERY      GASTRIC BYPASS      INGUINAL HERNIA REPAIR         Review of Systems        Objective:   Ht 6' 2" (1.88 m)   Wt 113.4 kg (250 lb)   BMI 32.10 kg/m² "     Physical Exam  LOWER EXTREMITY PHYSICAL EXAMINATION    ORTHOPEDIC:  History of left foot surgery likely associated with posterior tibial tendon advancement, Kidner, Lapidus, plantar fasciectomy, and tailor's bunion deformity.  Cavus foot type is noted.  No pain on palpation of the right foot.  No pain with range of motion.  Intrinsic and extrinsic musculature is intact.    VASCULAR:  The right dorsalis pedis pulse 2/4 and the right posterior tibial pulse 2/4.  The left dorsalis pedis pulse 2/4 and posterior tibial pulse on the left is 2/4.  Capillary refill is intact.  Pedal hair growth intact    NEUROLOGY:  Sharp/dull, light touch, proprioception all intact and equal bilaterally.  Slight decrease in assessing S1 dermatome.  Other areas are fully intact without complications  Vibratory sensation is intact.  Protective sensation intact    DERMATOLOGY:  Skin is supple, moist, intact.  Previous surgical scars noted to the left foot.  None present to the right.  Skin is the normal color, thickness, and texture.   There is no signs of ingrowing into the medial or lateral borders.  There is no evidence of wounds or skin breakdown.  No edema or erythema.  No obvious lacerations or fissuring.  Interdigital spaces are clean, dry, intact.  No rashes or scars appreciated.    Assessment:     1. Type 2 diabetes mellitus with other neurologic complication, without long-term current use of insulin    2. Peripheral neuritis of both feet    3. History of back pain    4. H/O orthostatic hypotension    5. History of foot surgery - Left Foot        Plan:     Type 2 diabetes mellitus with other neurologic complication, without long-term current use of insulin  -     Ambulatory referral/consult to Podiatry    Peripheral neuritis of both feet    History of back pain    H/O orthostatic hypotension    History of foot surgery - Left Foot      I counseled the patient on his/her Diabetic Mellitus regarding today's clinical examination and  objection findings. We did also discuss recent medication changes, pertinent labs and imaging evaluations and other medical consultation notes and progress notes. Greater than 50% of this visit was spent on counseling and coordination of care. Greater than 20 minutes of this appt. was spent on education about the diabetic foot, in relation to PVD and/or neuropathy, and the prevention of limb loss.     Shoe gear is inspected and wear and proper fit/type. Patient is reminded of the importance of good nutrition and blood sugar control to help prevent podiatric complications of diabetes. Patient instructed on proper foot hygeine. We discussed wearing proper shoe gear, daily foot inspections, never walking without protective shoe gear, never putting sharp instruments to feet.  Patient  will continue to monitor the areas daily, inspect feet, wear protective shoe gear when ambulatory, moisturizer to maintain skin integrity.     Patient's DMI/DMII is managed by Internal/Family Medicine Physician and/or Endocrinology Advanced Practice Provider.    Discussed with patient that the peripheral neuritis likely associated with back pain as it appears to be only affecting the S1 dermatome only present to the lateral aspect 5th metatarsal and 5th digit.  This does correlate patient's history of back pain and location of discomfort.  Would recommend continue to watch and monitor this area as other dermatomes to be fully intact without complications    Continue to utilize compression socks given patient's history of orthostatic hypotension to decrease swelling and increased pressure centrally

## 2023-10-09 ENCOUNTER — OFFICE VISIT (OUTPATIENT)
Dept: NEUROLOGY | Facility: CLINIC | Age: 43
End: 2023-10-09
Payer: MEDICAID

## 2023-10-09 DIAGNOSIS — U09.9 COVID-19 LONG HAULER MANIFESTING CHRONIC CONCENTRATION DEFICIT: Chronic | ICD-10-CM

## 2023-10-09 DIAGNOSIS — R41.840 COVID-19 LONG HAULER MANIFESTING CHRONIC CONCENTRATION DEFICIT: Chronic | ICD-10-CM

## 2023-10-09 DIAGNOSIS — G43.709 CHRONIC MIGRAINE WITHOUT AURA WITHOUT STATUS MIGRAINOSUS, NOT INTRACTABLE: Primary | Chronic | ICD-10-CM

## 2023-10-09 DIAGNOSIS — R20.0 NUMBNESS: ICD-10-CM

## 2023-10-09 DIAGNOSIS — E66.9 CLASS 1 OBESITY WITH SERIOUS COMORBIDITY AND BODY MASS INDEX (BMI) OF 32.0 TO 32.9 IN ADULT, UNSPECIFIED OBESITY TYPE: ICD-10-CM

## 2023-10-09 PROBLEM — E66.09 CLASS 1 OBESITY DUE TO EXCESS CALORIES WITH SERIOUS COMORBIDITY AND BODY MASS INDEX (BMI) OF 34.0 TO 34.9 IN ADULT: Status: RESOLVED | Noted: 2023-07-10 | Resolved: 2023-10-09

## 2023-10-09 PROBLEM — E66.811 CLASS 1 OBESITY DUE TO EXCESS CALORIES WITH SERIOUS COMORBIDITY AND BODY MASS INDEX (BMI) OF 34.0 TO 34.9 IN ADULT: Status: RESOLVED | Noted: 2023-07-10 | Resolved: 2023-10-09

## 2023-10-09 PROCEDURE — 1159F PR MEDICATION LIST DOCUMENTED IN MEDICAL RECORD: ICD-10-PCS | Mod: CPTII,95,, | Performed by: NURSE PRACTITIONER

## 2023-10-09 PROCEDURE — 1160F RVW MEDS BY RX/DR IN RCRD: CPT | Mod: CPTII,95,, | Performed by: NURSE PRACTITIONER

## 2023-10-09 PROCEDURE — 99214 PR OFFICE/OUTPT VISIT, EST, LEVL IV, 30-39 MIN: ICD-10-PCS | Mod: 95,,, | Performed by: NURSE PRACTITIONER

## 2023-10-09 PROCEDURE — 1159F MED LIST DOCD IN RCRD: CPT | Mod: CPTII,95,, | Performed by: NURSE PRACTITIONER

## 2023-10-09 PROCEDURE — 99214 OFFICE O/P EST MOD 30 MIN: CPT | Mod: 95,,, | Performed by: NURSE PRACTITIONER

## 2023-10-09 PROCEDURE — 1160F PR REVIEW ALL MEDS BY PRESCRIBER/CLIN PHARMACIST DOCUMENTED: ICD-10-PCS | Mod: CPTII,95,, | Performed by: NURSE PRACTITIONER

## 2023-10-09 RX ORDER — LINACLOTIDE 72 UG/1
72 CAPSULE, GELATIN COATED ORAL EVERY MORNING
COMMUNITY
Start: 2023-10-04
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
## 2023-11-08 ENCOUNTER — PROCEDURE VISIT (OUTPATIENT)
Dept: NEUROLOGY | Facility: CLINIC | Age: 43
End: 2023-11-08
Payer: MEDICAID

## 2023-11-08 VITALS
WEIGHT: 249.81 LBS | OXYGEN SATURATION: 98 % | TEMPERATURE: 98 F | RESPIRATION RATE: 16 BRPM | HEART RATE: 82 BPM | DIASTOLIC BLOOD PRESSURE: 78 MMHG | SYSTOLIC BLOOD PRESSURE: 109 MMHG | HEIGHT: 72 IN | BODY MASS INDEX: 33.83 KG/M2

## 2023-11-08 DIAGNOSIS — G43.709 CHRONIC MIGRAINE WITHOUT AURA WITHOUT STATUS MIGRAINOSUS, NOT INTRACTABLE: Primary | ICD-10-CM

## 2023-11-08 PROCEDURE — 64615 PR CHEMODENERVATION OF MUSCLE FOR CHRONIC MIGRAINE: ICD-10-PCS | Mod: S$PBB,,, | Performed by: PSYCHIATRY & NEUROLOGY

## 2023-11-08 PROCEDURE — 64615 CHEMODENERV MUSC MIGRAINE: CPT | Mod: PBBFAC | Performed by: PSYCHIATRY & NEUROLOGY

## 2023-11-08 PROCEDURE — 64615 CHEMODENERV MUSC MIGRAINE: CPT | Mod: S$PBB,,, | Performed by: PSYCHIATRY & NEUROLOGY

## 2023-11-08 RX ORDER — METFORMIN HYDROCHLORIDE 500 MG/1
500 TABLET ORAL 2 TIMES DAILY
COMMUNITY
End: 2024-01-31

## 2023-11-08 RX ORDER — METOCLOPRAMIDE 10 MG/1
10 TABLET ORAL
COMMUNITY

## 2023-11-08 RX ADMIN — ONABOTULINUMTOXINA 200 UNITS: 200 INJECTION, POWDER, LYOPHILIZED, FOR SOLUTION INTRADERMAL; INTRAMUSCULAR at 10:11

## 2023-11-08 NOTE — PROCEDURES
[facility] Neurology Outpatient Botox Procedure Note    History of Present Illness     This is 43 y.o. female with history of ADHD, type 2 diabetes, who is referred chronic migraine without aura, not intractable. Patient is here for Botox #4.     Age of Onset : 7 years old      Headache Description:   1. Frontal, holocephalic, pounding, severe, impeding day to day activity, lasting 2 days, w/ nausea, w/ photophobia and phonophobia   2. Frontal, occipital, dull, mild, duration varies, w/o nausea, w/o photophobia and phonophobia      Baseline Headache Frequency: daily headache days per month 12 - 16 migraine headache days per month  Interval Headache Frequency: 1-2 migraine headache days per month    Current Prophylactic  TPM  mg daily   Paroxetine 30 mg daily      Current Abortive  Methocarbamol  Maxalt 10 mg BID PRN  Reglan 5 mg BID PRN     Review of System      reviewed. stable.    Focused Exam     Stable. Reviewed.    Assessment     This is 43 y.o. female with history of ADHD, type 2 diabetes, who is referred chronic migraine without aura, not intractable. Patient is here for Botox #4.     Procedure     Date of procedure: 11/8/2023    Procedure: Chronic Migraine Chemodenervation with Botulinum toxin    The patient was identified and informed consent was reviewed with the patient, and we discussed the risks, benefits and alternatives. Specifically, we discussed the risks of bleeding, infection and nerve injury with worsened pain and function. Specifically, we discussed the most frequently reported adverse reactions following injection of BOTOX include headache (5%), eyelid ptosis (4%), muscular weakness (4%), bronchitis (3%), injection-site pain (3%), musculoskeletal pain (3%), myalgia (3%), facial paresis (2%), hypertension (2%), and muscle spasms (2%). The patient verbalized an understanding of these risks and the symptoms and the potentially catastrophic consequences of this occurrence. The patient verbalized an  understanding that if she should begin to have these symptoms that she should immediately go to the nearest emergency room for evaluation. The patient was then positioned. The injection sites were identified and was prepped with Alcohol. 4cc of preservative free normal saline was mixed with 200 units of Botox. A 30-gauge, 0.5 inch needle was then used to inject a total 155 units of Botox. Muscles injected as below. Patient tolerated the procedure well with no complaints.    A. : Botox dosage: 10 units in 2 sites Right: 5 Left: 5   B. Procerus Botox dosage: 5 Units in 1 site   C. Frontalis Botox dosage: 20 Units divided in 4 sites Right: 10 Left: 10   D. Temporalis Botox dosage: 40 Units divided in 8 sites Right: 20 Left: 20   E. Occipitalis Botox dosage 30 Units divided in 6 sites Right: 15 Left: 15   F. Cervical Paraspinal Botox dosage: 20 Units divided in 4 sites: Right 10 Left: 10   G. Trapezius Botox dosage: 30 Units divided in 6 sites: Right: 15 Left: 15     Total Units Injected: 155 units   Total Units discarded: 45 units     Follow Up       RTC BOTOX    Mariana Avilez MD   [facility] Neurology

## 2024-01-02 DIAGNOSIS — G43.709 CHRONIC MIGRAINE WITHOUT AURA WITHOUT STATUS MIGRAINOSUS, NOT INTRACTABLE: Chronic | ICD-10-CM

## 2024-01-02 RX ORDER — TOPIRAMATE 100 MG/1
TABLET, FILM COATED ORAL
Qty: 30 TABLET | Refills: 5 | Status: SHIPPED | OUTPATIENT
Start: 2024-01-02

## 2024-01-31 ENCOUNTER — PROCEDURE VISIT (OUTPATIENT)
Dept: NEUROLOGY | Facility: CLINIC | Age: 44
End: 2024-01-31
Payer: MEDICAID

## 2024-01-31 VITALS
RESPIRATION RATE: 16 BRPM | TEMPERATURE: 98 F | OXYGEN SATURATION: 99 % | WEIGHT: 264.63 LBS | HEIGHT: 72 IN | DIASTOLIC BLOOD PRESSURE: 85 MMHG | SYSTOLIC BLOOD PRESSURE: 124 MMHG | BODY MASS INDEX: 35.84 KG/M2 | HEART RATE: 72 BPM

## 2024-01-31 DIAGNOSIS — G43.709 CHRONIC MIGRAINE WITHOUT AURA WITHOUT STATUS MIGRAINOSUS, NOT INTRACTABLE: Primary | ICD-10-CM

## 2024-01-31 PROCEDURE — 64615 CHEMODENERV MUSC MIGRAINE: CPT | Mod: S$PBB,,, | Performed by: PSYCHIATRY & NEUROLOGY

## 2024-01-31 PROCEDURE — 64615 CHEMODENERV MUSC MIGRAINE: CPT | Mod: PBBFAC | Performed by: PSYCHIATRY & NEUROLOGY

## 2024-01-31 RX ORDER — TIZANIDINE 4 MG/1
4 TABLET ORAL EVERY 6 HOURS PRN
COMMUNITY
Start: 2023-12-11 | End: 2024-12-10

## 2024-01-31 RX ORDER — AMMONIUM LACTATE 12 G/100G
1 CREAM TOPICAL 2 TIMES DAILY
COMMUNITY
Start: 2024-01-22

## 2024-01-31 RX ORDER — CLOBETASOL PROPIONATE 0.46 MG/ML
SOLUTION TOPICAL 2 TIMES DAILY
COMMUNITY
Start: 2024-01-22

## 2024-01-31 RX ORDER — DICLOFENAC SODIUM 75 MG/1
75 TABLET, DELAYED RELEASE ORAL 2 TIMES DAILY
COMMUNITY

## 2024-01-31 RX ORDER — METFORMIN HYDROCHLORIDE 500 MG/1
500 TABLET, EXTENDED RELEASE ORAL 2 TIMES DAILY
COMMUNITY
Start: 2024-01-09

## 2024-01-31 RX ADMIN — ONABOTULINUMTOXINA 200 UNITS: 200 INJECTION, POWDER, LYOPHILIZED, FOR SOLUTION INTRADERMAL; INTRAMUSCULAR at 09:01

## 2024-01-31 NOTE — PROCEDURES
[facility] Neurology Outpatient Botox Procedure Note    History of Present Illness     This is 43 y.o. female with history of ADHD, type 2 diabetes, who is referred chronic migraine without aura, not intractable. Patient is here for Botox #5.     Age of Onset : 7 years old      Headache Description:   1. Frontal, holocephalic, pounding, severe, impeding day to day activity, lasting 2 days, w/ nausea, w/ photophobia and phonophobia   2. Frontal, occipital, dull, mild, duration varies, w/o nausea, w/o photophobia and phonophobia      Baseline Headache Frequency: daily headache days per month 12 - 16 migraine headache days per month  Interval Headache Frequency: 1-2 migraine headache days per month    Current Prophylactic  TPM  mg daily   Paroxetine 30 mg daily      Current Abortive  Methocarbamol  Maxalt 10 mg BID PRN  Reglan 5 mg BID PRN     Review of System      reviewed. stable.    Focused Exam     Stable. Reviewed.    Assessment     This is 43 y.o. female with history of ADHD, type 2 diabetes, who is referred chronic migraine without aura, not intractable. Patient is here for Botox #5.     Procedure     Date of procedure: 1/31/2024    Procedure: Chronic Migraine Chemodenervation with Botulinum toxin    The patient was identified and informed consent was reviewed with the patient, and we discussed the risks, benefits and alternatives. Specifically, we discussed the risks of bleeding, infection and nerve injury with worsened pain and function. Specifically, we discussed the most frequently reported adverse reactions following injection of BOTOX include headache (5%), eyelid ptosis (4%), muscular weakness (4%), bronchitis (3%), injection-site pain (3%), musculoskeletal pain (3%), myalgia (3%), facial paresis (2%), hypertension (2%), and muscle spasms (2%). The patient verbalized an understanding of these risks and the symptoms and the potentially catastrophic consequences of this occurrence. The patient verbalized an  understanding that if she should begin to have these symptoms that she should immediately go to the nearest emergency room for evaluation. The patient was then positioned. The injection sites were identified and was prepped with Alcohol. 4cc of preservative free normal saline was mixed with 200 units of Botox. A 30-gauge, 0.5 inch needle was then used to inject a total 155 units of Botox. Muscles injected as below. Patient tolerated the procedure well with no complaints.    A. : Botox dosage: 10 units in 2 sites Right: 5 Left: 5   B. Procerus Botox dosage: 5 Units in 1 site   C. Frontalis Botox dosage: 20 Units divided in 4 sites Right: 10 Left: 10   D. Temporalis Botox dosage: 40 Units divided in 8 sites Right: 20 Left: 20   E. Occipitalis Botox dosage 30 Units divided in 6 sites Right: 15 Left: 15   F. Cervical Paraspinal Botox dosage: 20 Units divided in 4 sites: Right 10 Left: 10   G. Trapezius Botox dosage: 30 Units divided in 6 sites: Right: 15 Left: 15     Total Units Injected: 155 units   Total Units discarded: 45 units     Follow Up       RTC BOTOX    Mariana Avilez MD   [facility] General Neurology

## 2024-07-11 ENCOUNTER — PROCEDURE VISIT (OUTPATIENT)
Dept: NEUROLOGY | Facility: CLINIC | Age: 44
End: 2024-07-11
Payer: MEDICARE

## 2024-07-11 VITALS
RESPIRATION RATE: 18 BRPM | HEART RATE: 75 BPM | TEMPERATURE: 98 F | WEIGHT: 250.44 LBS | SYSTOLIC BLOOD PRESSURE: 136 MMHG | HEIGHT: 72 IN | OXYGEN SATURATION: 98 % | BODY MASS INDEX: 33.92 KG/M2 | DIASTOLIC BLOOD PRESSURE: 86 MMHG

## 2024-07-11 DIAGNOSIS — G43.709 CHRONIC MIGRAINE WITHOUT AURA WITHOUT STATUS MIGRAINOSUS, NOT INTRACTABLE: ICD-10-CM

## 2024-07-11 NOTE — PROCEDURES
[facility] Neurology Outpatient Botox Procedure Note    History of Present Illness     This is 44 y.o. female with history of ADHD, type 2 diabetes, who is referred chronic migraine without aura, not intractable. Patient is here for Botox #6.     Age of Onset : 7 years old      Headache Description:   1. Frontal, holocephalic, pounding, severe, impeding day to day activity, lasting 2 days, w/ nausea, w/ photophobia and phonophobia   2. Frontal, occipital, dull, mild, duration varies, w/o nausea, w/o photophobia and phonophobia      Baseline Headache Frequency: daily headache days per month 12 - 16 migraine headache days per month  Interval Headache Frequency: 1-2 migraine headache days per month    Current Prophylactic  TPM  mg daily   Paroxetine 30 mg daily      Current Abortive  Methocarbamol  Maxalt 10 mg BID PRN  Reglan 5 mg BID PRN     Review of System      reviewed. stable.    Focused Exam     Stable. Reviewed.    Assessment     This is 44 y.o. female with history of ADHD, type 2 diabetes, who is referred chronic migraine without aura, not intractable. Patient is here for Botox #6.     Procedure     Date of procedure: 7/11/2024    Procedure: Chronic Migraine Chemodenervation with Botulinum toxin    The patient was identified and informed consent was reviewed with the patient, and we discussed the risks, benefits and alternatives. Specifically, we discussed the risks of bleeding, infection and nerve injury with worsened pain and function. Specifically, we discussed the most frequently reported adverse reactions following injection of BOTOX include headache (5%), eyelid ptosis (4%), muscular weakness (4%), bronchitis (3%), injection-site pain (3%), musculoskeletal pain (3%), myalgia (3%), facial paresis (2%), hypertension (2%), and muscle spasms (2%). The patient verbalized an understanding of these risks and the symptoms and the potentially catastrophic consequences of this occurrence. The patient verbalized an  understanding that if she should begin to have these symptoms that she should immediately go to the nearest emergency room for evaluation. The patient was then positioned. The injection sites were identified and was prepped with Alcohol. 4cc of preservative free normal saline was mixed with 200 units of Botox. A 30-gauge, 0.5 inch needle was then used to inject a total 155 units of Botox. Muscles injected as below. Patient tolerated the procedure well with no complaints.    A. : Botox dosage: 10 units in 2 sites Right: 5 Left: 5   B. Procerus Botox dosage: 5 Units in 1 site   C. Frontalis Botox dosage: 20 Units divided in 4 sites Right: 10 Left: 10   D. Temporalis Botox dosage: 40 Units divided in 8 sites Right: 20 Left: 20   E. Occipitalis Botox dosage 30 Units divided in 6 sites Right: 15 Left: 15   F. Cervical Paraspinal Botox dosage: 20 Units divided in 4 sites: Right 10 Left: 10   G. Trapezius Botox dosage: 30 Units divided in 6 sites: Right: 15 Left: 15     Total Units Injected: 155 units   Total Units discarded: 45 units     Follow Up       RTC BOTOX    Mariana Avilez MD   [facility] General Neurology

## 2024-08-06 DIAGNOSIS — G43.709 CHRONIC MIGRAINE WITHOUT AURA WITHOUT STATUS MIGRAINOSUS, NOT INTRACTABLE: Chronic | ICD-10-CM

## 2024-08-06 RX ORDER — TOPIRAMATE 100 MG/1
TABLET, FILM COATED ORAL
Qty: 30 TABLET | Refills: 5 | Status: SHIPPED | OUTPATIENT
Start: 2024-08-06

## 2024-08-20 PROCEDURE — 87591 N.GONORRHOEAE DNA AMP PROB: CPT | Performed by: OBSTETRICS & GYNECOLOGY

## 2024-08-20 PROCEDURE — 87491 CHLMYD TRACH DNA AMP PROBE: CPT | Performed by: OBSTETRICS & GYNECOLOGY

## 2024-08-20 PROCEDURE — 87661 TRICHOMONAS VAGINALIS AMPLIF: CPT | Performed by: OBSTETRICS & GYNECOLOGY

## 2024-08-20 PROCEDURE — 87624 HPV HI-RISK TYP POOLED RSLT: CPT | Performed by: OBSTETRICS & GYNECOLOGY

## 2024-08-23 ENCOUNTER — PATIENT MESSAGE (OUTPATIENT)
Dept: NEUROLOGY | Facility: CLINIC | Age: 44
End: 2024-08-23
Payer: MEDICARE

## 2024-08-23 DIAGNOSIS — G43.709 CHRONIC MIGRAINE WITHOUT AURA WITHOUT STATUS MIGRAINOSUS, NOT INTRACTABLE: Chronic | ICD-10-CM

## 2024-08-23 DIAGNOSIS — G43.709 CHRONIC MIGRAINE WITHOUT AURA WITHOUT STATUS MIGRAINOSUS, NOT INTRACTABLE: Primary | ICD-10-CM

## 2024-08-23 RX ORDER — TOPIRAMATE 25 MG/1
25 TABLET ORAL 2 TIMES DAILY
Qty: 60 TABLET | Refills: 11 | Status: SHIPPED | OUTPATIENT
Start: 2024-08-23 | End: 2025-08-23

## 2024-08-23 RX ORDER — TOPIRAMATE 100 MG/1
100 TABLET, FILM COATED ORAL DAILY
Qty: 30 TABLET | Refills: 4 | Status: SHIPPED | OUTPATIENT
Start: 2024-08-23 | End: 2025-01-20

## 2024-08-29 ENCOUNTER — HOSPITAL ENCOUNTER (OUTPATIENT)
Dept: RADIOLOGY | Facility: HOSPITAL | Age: 44
Discharge: HOME | End: 2024-08-29
Attending: OBSTETRICS & GYNECOLOGY
Payer: MEDICARE

## 2024-08-29 DIAGNOSIS — Z12.31 ENCOUNTER FOR SCREENING MAMMOGRAM FOR MALIGNANT NEOPLASM OF BREAST: ICD-10-CM

## 2024-08-29 PROCEDURE — 77063 BREAST TOMOSYNTHESIS BI: CPT | Mod: TC

## 2024-10-10 ENCOUNTER — PROCEDURE VISIT (OUTPATIENT)
Dept: NEUROLOGY | Facility: CLINIC | Age: 44
End: 2024-10-10
Payer: MEDICARE

## 2024-10-10 VITALS
HEART RATE: 75 BPM | HEIGHT: 72 IN | SYSTOLIC BLOOD PRESSURE: 128 MMHG | BODY MASS INDEX: 35.21 KG/M2 | WEIGHT: 260 LBS | DIASTOLIC BLOOD PRESSURE: 84 MMHG | RESPIRATION RATE: 12 BRPM | TEMPERATURE: 98 F

## 2024-10-10 DIAGNOSIS — G43.709 CHRONIC MIGRAINE WITHOUT AURA WITHOUT STATUS MIGRAINOSUS, NOT INTRACTABLE: Primary | ICD-10-CM

## 2024-10-10 PROCEDURE — 64615 CHEMODENERV MUSC MIGRAINE: CPT | Mod: PBBFAC | Performed by: PSYCHIATRY & NEUROLOGY

## 2024-10-10 PROCEDURE — 64615 CHEMODENERV MUSC MIGRAINE: CPT | Mod: S$PBB,,, | Performed by: PSYCHIATRY & NEUROLOGY

## 2024-10-10 RX ORDER — AMOXICILLIN 500 MG
2 CAPSULE ORAL DAILY
COMMUNITY

## 2024-10-10 RX ORDER — LANOLIN ALCOHOL/MO/W.PET/CERES
1 CREAM (GRAM) TOPICAL
COMMUNITY

## 2024-10-10 NOTE — PROCEDURES
[facility] Neurology Outpatient Botox Procedure Note    History of Present Illness     This is 44 y.o. female with history of ADHD, type 2 diabetes, who is referred chronic migraine without aura, not intractable. Patient is here for Botox #7.     Age of Onset : 7 years old      Headache Description:   1. Frontal, holocephalic, pounding, severe, impeding day to day activity, lasting 2 days, w/ nausea, w/ photophobia and phonophobia   2. Frontal, occipital, dull, mild, duration varies, w/o nausea, w/o photophobia and phonophobia      Baseline Headache Frequency: daily headache days per month 12 - 16 migraine headache days per month  Interval Headache Frequency: 1-2 migraine headache days per month    Current Prophylactic  TPM  mg daily   Paroxetine 30 mg daily      Current Abortive  Methocarbamol  Maxalt 10 mg BID PRN  Reglan 5 mg BID PRN     Review of System      reviewed. stable.    Focused Exam     Stable. Reviewed.    Assessment     This is 44 y.o. female with history of ADHD, type 2 diabetes, who is referred chronic migraine without aura, not intractable. Patient is here for Botox #7.     Procedure     Date of procedure: 10/10/2024    Procedure: Chronic Migraine Chemodenervation with Botulinum toxin    The patient was identified and informed consent was reviewed with the patient, and we discussed the risks, benefits and alternatives. Specifically, we discussed the risks of bleeding, infection and nerve injury with worsened pain and function. Specifically, we discussed the most frequently reported adverse reactions following injection of BOTOX include headache (5%), eyelid ptosis (4%), muscular weakness (4%), bronchitis (3%), injection-site pain (3%), musculoskeletal pain (3%), myalgia (3%), facial paresis (2%), hypertension (2%), and muscle spasms (2%). The patient verbalized an understanding of these risks and the symptoms and the potentially catastrophic consequences of this occurrence. The patient verbalized  an understanding that if she should begin to have these symptoms that she should immediately go to the nearest emergency room for evaluation. The patient was then positioned. The injection sites were identified and was prepped with Alcohol. 4cc of preservative free normal saline was mixed with 200 units of Botox. A 30-gauge, 0.5 inch needle was then used to inject a total 155 units of Botox. Muscles injected as below. Patient tolerated the procedure well with no complaints.    A. : Botox dosage: 10 units in 2 sites Right: 5 Left: 5   B. Procerus Botox dosage: 5 Units in 1 site   C. Frontalis Botox dosage: 20 Units divided in 4 sites Right: 10 Left: 10   D. Temporalis Botox dosage: 40 Units divided in 8 sites Right: 20 Left: 20   E. Occipitalis Botox dosage 30 Units divided in 6 sites Right: 15 Left: 15   F. Cervical Paraspinal Botox dosage: 20 Units divided in 4 sites: Right 10 Left: 10   G. Trapezius Botox dosage: 30 Units divided in 6 sites: Right: 15 Left: 15     Total Units Injected: 155 units   Total Units discarded: 45 units     Follow Up       RTC BOTOX    Mariana Avilez MD   [facility] General Neurology

## 2024-11-22 ENCOUNTER — PATIENT MESSAGE (OUTPATIENT)
Dept: RESEARCH | Facility: HOSPITAL | Age: 44
End: 2024-11-22
Payer: MEDICARE

## 2025-05-06 DIAGNOSIS — G43.709 CHRONIC MIGRAINE WITHOUT AURA WITHOUT STATUS MIGRAINOSUS, NOT INTRACTABLE: Chronic | ICD-10-CM

## 2025-05-06 RX ORDER — TOPIRAMATE 100 MG/1
TABLET, FILM COATED ORAL
Qty: 30 TABLET | Refills: 5 | Status: SHIPPED | OUTPATIENT
Start: 2025-05-06

## 2025-08-24 DIAGNOSIS — G43.709 CHRONIC MIGRAINE WITHOUT AURA WITHOUT STATUS MIGRAINOSUS, NOT INTRACTABLE: ICD-10-CM

## 2025-08-25 RX ORDER — TOPIRAMATE 25 MG/1
25 TABLET, FILM COATED ORAL 2 TIMES DAILY
Qty: 60 TABLET | Refills: 6 | Status: SHIPPED | OUTPATIENT
Start: 2025-08-25